# Patient Record
Sex: FEMALE | Race: OTHER | HISPANIC OR LATINO
[De-identification: names, ages, dates, MRNs, and addresses within clinical notes are randomized per-mention and may not be internally consistent; named-entity substitution may affect disease eponyms.]

---

## 2022-10-08 ENCOUNTER — NON-APPOINTMENT (OUTPATIENT)
Age: 22
End: 2022-10-08

## 2022-10-13 ENCOUNTER — NON-APPOINTMENT (OUTPATIENT)
Age: 22
End: 2022-10-13

## 2022-10-13 DIAGNOSIS — R10.32 LEFT LOWER QUADRANT PAIN: ICD-10-CM

## 2022-10-13 DIAGNOSIS — Z78.9 OTHER SPECIFIED HEALTH STATUS: ICD-10-CM

## 2022-10-13 PROBLEM — Z00.00 ENCOUNTER FOR PREVENTIVE HEALTH EXAMINATION: Status: ACTIVE | Noted: 2022-10-13

## 2022-10-13 RX ORDER — PANTOPRAZOLE 40 MG/1
40 TABLET, DELAYED RELEASE ORAL
Refills: 0 | Status: ACTIVE | COMMUNITY

## 2022-10-13 RX ORDER — FAMOTIDINE 20 MG/1
20 TABLET, FILM COATED ORAL
Refills: 0 | Status: ACTIVE | COMMUNITY

## 2022-10-13 RX ORDER — ONDANSETRON HYDROCHLORIDE 4 MG/1
4 TABLET, FILM COATED ORAL
Refills: 0 | Status: ACTIVE | COMMUNITY

## 2022-10-18 ENCOUNTER — NON-APPOINTMENT (OUTPATIENT)
Age: 22
End: 2022-10-18

## 2022-10-18 ENCOUNTER — APPOINTMENT (OUTPATIENT)
Dept: GASTROENTEROLOGY | Facility: CLINIC | Age: 22
End: 2022-10-18

## 2022-10-18 VITALS
HEART RATE: 74 BPM | DIASTOLIC BLOOD PRESSURE: 68 MMHG | HEIGHT: 64.17 IN | WEIGHT: 145 LBS | SYSTOLIC BLOOD PRESSURE: 118 MMHG | BODY MASS INDEX: 24.75 KG/M2

## 2022-10-18 DIAGNOSIS — R10.10 UPPER ABDOMINAL PAIN, UNSPECIFIED: ICD-10-CM

## 2022-10-18 DIAGNOSIS — R11.2 NAUSEA WITH VOMITING, UNSPECIFIED: ICD-10-CM

## 2022-10-18 PROCEDURE — 99204 OFFICE O/P NEW MOD 45 MIN: CPT

## 2022-10-18 RX ORDER — FAMOTIDINE 40 MG/1
40 TABLET, FILM COATED ORAL TWICE DAILY
Qty: 60 | Refills: 3 | Status: ACTIVE | COMMUNITY
Start: 2022-10-18 | End: 1900-01-01

## 2022-10-18 NOTE — HISTORY OF PRESENT ILLNESS
[FreeTextEntry1] : \par \par This HPI  reflects a summary and review of records : including previous and most recent  Labs, body imaging, consults and progress notes, operative and pathology reports, EKG reports, ED records, found in [facility], [facility],  [facility] and any additional records brought in by  the patient at the time of the visit.\par \par \par \par PCP: OOD \par \par 23 yo F w h/o " gastritis " --> 12/10/21 [facility] ED: p/w epigastric pain, N/V and chills\par Labs: WBC=15, Hgb=13, Lipase--wnl, Ast=38;   Abd sono: wnl\par \par 10/8/22 [facility]: p/w sore throat, nasal congestion, cough, myalagias, H/A;  Covid neg\par \par 10/11/22 [facility] ED:  c/o epiagastric pain--> burning , N/V\par Labs: WBC=12.8, Hgb=12, Lipase=75, LFTs--> wnl\par Rx w Pantop 40, Pepcid 20mg bid, Zofran\par \par 10/13/22 [facility] ED: c/o recurrent vomting: on 10/21/22 Vomited after B: cereal & bananas; L: soup w tomatoes and rice & 10/13/22 B: mashed potatoes\par Labs: WBC=10, Lipase=90, LFT--> wnl\par \par 10/18/22 :    Today: no c/o , CP, SOB/ HOWARD , Wheeze, Palpitations, edema\par \par   Most recent labs and imaging reviewed w patient: cbc, cmet, tsh, HgbA1c--  ,LDL--  ,Vit. D--  ,psa--wnl   \par \par 10/18/22   Today: Still on Famotidine BID\par                            Feeling better, No pain, N/V, D\par                             pain was epigastric, burning, also felt and emptiness \par                             has a lingering cough, no sputum \par \par * Abd pain-->no\par * Nausea--> no\par * Vomit--> no\par * Early satiety--> no\par * Belching--> no\par * Hiccups--> no\par * Regurgitation--> no\par * Acid Taste / Water Brash--> no\par * Ht burn--> no\par * Dysphagia--> no\par * Throat Clearing--> no\par * Hoarseness--> no\par * Post-Nasal Drip--> no\par * Congestion--> no\par * Globus--> no\par * Cough--> no\par * Wheeze / PC-> -no\par * BMs: # 4 qd\par * Constipation--> no\par * Diarrhea--> no\par * Bloating--> no\par * Strain on Defecation--> no\par * Incompl Evac--> no\par * Flatulence--> no\par * Gurgling--> no\par * Melena--> no\par * BPBPR-> -no\par * Anorexia--> no\par * Wt. Loss--> no\par \par

## 2022-10-18 NOTE — ASSESSMENT
[FreeTextEntry1] : \par \par \par 1.  Epigastric Pain\par      assoc w recurrent N/V\par     \par R/O : Viral GE w decr gastric emptying\par          ? PUD\par          Functional Bowel D/O--> ? CVS\par \par P : diet should be anti-reflux, LR, lactose and fat free\par      Life style changes reviewed \par      No ETOH/ NSAIDS\par \par      Pepcid 40mg BID \par      Zofran 4mg q am\par      f/u in 4-6 weeks \par      \par       ? EGD if no response \par \par \par \par \par \par

## 2023-10-29 ENCOUNTER — NON-APPOINTMENT (OUTPATIENT)
Age: 23
End: 2023-10-29

## 2024-10-30 ENCOUNTER — NON-APPOINTMENT (OUTPATIENT)
Age: 24
End: 2024-10-30

## 2024-12-25 ENCOUNTER — NON-APPOINTMENT (OUTPATIENT)
Age: 24
End: 2024-12-25

## 2025-01-03 ENCOUNTER — NON-APPOINTMENT (OUTPATIENT)
Age: 25
End: 2025-01-03